# Patient Record
Sex: MALE | URBAN - METROPOLITAN AREA
[De-identification: names, ages, dates, MRNs, and addresses within clinical notes are randomized per-mention and may not be internally consistent; named-entity substitution may affect disease eponyms.]

---

## 2018-02-24 ENCOUNTER — HOSPITAL ENCOUNTER (OUTPATIENT)
Dept: MEDSURG UNIT | Facility: HOSPITAL | Age: 38
End: 2018-02-26
Attending: INTERNAL MEDICINE | Admitting: INTERNAL MEDICINE

## 2018-02-24 LAB
ABS NEUT (OLG): 4.09 X10(3)/MCL (ref 2.1–9.2)
ALBUMIN SERPL-MCNC: 2.9 GM/DL (ref 3.4–5)
ALBUMIN/GLOB SERPL: 1 RATIO (ref 1–2)
ALP SERPL-CCNC: 57 UNIT/L (ref 45–117)
ALT SERPL-CCNC: 52 UNIT/L (ref 12–78)
AMPHET UR QL SCN: NEGATIVE
APPEARANCE, UA: CLEAR
AST SERPL-CCNC: 20 UNIT/L (ref 15–37)
BACTERIA #/AREA URNS AUTO: ABNORMAL /[HPF]
BARBITURATE SCN PRESENT UR: NEGATIVE
BASOPHILS # BLD AUTO: 0.05 X10(3)/MCL
BASOPHILS NFR BLD AUTO: 1 %
BENZODIAZ UR QL SCN: NEGATIVE
BILIRUB SERPL-MCNC: 0.3 MG/DL (ref 0.2–1)
BILIRUB UR QL STRIP: NEGATIVE
BILIRUBIN DIRECT+TOT PNL SERPL-MCNC: <0.1 MG/DL
BILIRUBIN DIRECT+TOT PNL SERPL-MCNC: ABNORMAL MG/DL
BUN SERPL-MCNC: 5 MG/DL (ref 7–18)
CALCIUM SERPL-MCNC: 8.5 MG/DL (ref 8.5–10.1)
CANNABINOIDS UR QL SCN: NEGATIVE
CHLORIDE SERPL-SCNC: 105 MMOL/L (ref 98–107)
CK MB SERPL-MCNC: 2.5 NG/ML (ref 1–3.6)
CK SERPL-CCNC: 105 UNIT/L (ref 39–308)
CO2 SERPL-SCNC: 29 MMOL/L (ref 21–32)
COCAINE UR QL SCN: NEGATIVE
COLOR UR: YELLOW
CREAT SERPL-MCNC: 1 MG/DL (ref 0.6–1.3)
EOSINOPHIL # BLD AUTO: 0.16 X10(3)/MCL
EOSINOPHIL NFR BLD AUTO: 2 %
ERYTHROCYTE [DISTWIDTH] IN BLOOD BY AUTOMATED COUNT: 13.1 % (ref 11.5–14.5)
ETHANOL SERPL-MCNC: <3 MG/DL
GLOBULIN SER-MCNC: 3.5 GM/ML (ref 2.3–3.5)
GLUCOSE (UA): NORMAL
GLUCOSE SERPL-MCNC: 91 MG/DL (ref 74–106)
HCT VFR BLD AUTO: 41 % (ref 40–51)
HGB BLD-MCNC: 13.7 GM/DL (ref 13.5–17.5)
HGB UR QL STRIP: NEGATIVE
HYALINE CASTS #/AREA URNS LPF: ABNORMAL /[LPF]
IMM GRANULOCYTES # BLD AUTO: 0.03 10*3/UL
IMM GRANULOCYTES NFR BLD AUTO: 0 %
KETONES UR QL STRIP: ABNORMAL
LEUKOCYTE ESTERASE UR QL STRIP: 25 LEU/UL
LITHIUM SERPL-MCNC: 0.8 MMOL/L (ref 0.6–1.2)
LYMPHOCYTES # BLD AUTO: 3 X10(3)/MCL
LYMPHOCYTES NFR BLD AUTO: 36 % (ref 13–40)
MAGNESIUM SERPL-MCNC: 1.8 MG/DL (ref 1.8–2.4)
MCH RBC QN AUTO: 30.3 PG (ref 26–34)
MCHC RBC AUTO-ENTMCNC: 33.4 GM/DL (ref 31–37)
MCV RBC AUTO: 90.7 FL (ref 80–100)
MONOCYTES # BLD AUTO: 0.95 X10(3)/MCL
MONOCYTES NFR BLD AUTO: 12 % (ref 4–12)
NEUTROPHILS # BLD AUTO: 4.09 X10(3)/MCL
NEUTROPHILS NFR BLD AUTO: 49 X10(3)/MCL
NITRITE UR QL STRIP: NEGATIVE
OPIATES UR QL SCN: NEGATIVE
PCP UR QL: NEGATIVE
PH UR STRIP.AUTO: 7 [PH] (ref 5–8)
PH UR STRIP: 7 [PH] (ref 4.5–8)
PHOSPHATE SERPL-MCNC: 3 MG/DL (ref 2.5–4.9)
PLATELET # BLD AUTO: 219 X10(3)/MCL (ref 130–400)
PMV BLD AUTO: 11.2 FL (ref 7.4–10.4)
POC TROPONIN: 0 NG/ML (ref 0–0.08)
POTASSIUM SERPL-SCNC: 3.6 MMOL/L (ref 3.5–5.1)
PROT SERPL-MCNC: 6.4 GM/DL (ref 6.4–8.2)
PROT UR QL STRIP: 10 MG/DL
RBC # BLD AUTO: 4.52 X10(6)/MCL (ref 4.5–5.9)
RBC #/AREA URNS AUTO: ABNORMAL /[HPF]
SODIUM SERPL-SCNC: 140 MMOL/L (ref 136–145)
SP GR UR STRIP: 1.01 (ref 1–1.03)
SQUAMOUS #/AREA URNS LPF: ABNORMAL /[LPF]
TEMPERATURE, URINE (OHS): 25 DEGC (ref 20–25)
TSH SERPL-ACNC: 1.74 MIU/L (ref 0.36–3.74)
UROBILINOGEN UR STRIP-ACNC: 2 MG/DL
WBC # SPEC AUTO: 8.3 X10(3)/MCL (ref 4.5–11)
WBC #/AREA URNS AUTO: ABNORMAL /HPF

## 2018-02-25 LAB
ABS NEUT (OLG): 10.36 X10(3)/MCL (ref 2.1–9.2)
ALBUMIN SERPL-MCNC: 2.7 GM/DL (ref 3.4–5)
ALBUMIN/GLOB SERPL: 1 RATIO (ref 1–2)
ALP SERPL-CCNC: 47 UNIT/L (ref 45–117)
ALT SERPL-CCNC: 47 UNIT/L (ref 12–78)
AST SERPL-CCNC: 20 UNIT/L (ref 15–37)
BASOPHILS # BLD AUTO: 0.01 X10(3)/MCL
BASOPHILS NFR BLD AUTO: 0 %
BILIRUB SERPL-MCNC: 0.4 MG/DL (ref 0.2–1)
BILIRUBIN DIRECT+TOT PNL SERPL-MCNC: 0.1 MG/DL
BILIRUBIN DIRECT+TOT PNL SERPL-MCNC: 0.3 MG/DL
BUN SERPL-MCNC: 9 MG/DL (ref 7–18)
CALCIUM SERPL-MCNC: 9.1 MG/DL (ref 8.5–10.1)
CHLORIDE SERPL-SCNC: 107 MMOL/L (ref 98–107)
CO2 SERPL-SCNC: 27 MMOL/L (ref 21–32)
CREAT SERPL-MCNC: 1.2 MG/DL (ref 0.6–1.3)
ERYTHROCYTE [DISTWIDTH] IN BLOOD BY AUTOMATED COUNT: 13.1 % (ref 11.5–14.5)
GLOBULIN SER-MCNC: 3.5 GM/ML (ref 2.3–3.5)
GLUCOSE SERPL-MCNC: 144 MG/DL (ref 74–106)
HCT VFR BLD AUTO: 41 % (ref 40–51)
HGB BLD-MCNC: 13.7 GM/DL (ref 13.5–17.5)
IMM GRANULOCYTES # BLD AUTO: 0.06 10*3/UL
IMM GRANULOCYTES NFR BLD AUTO: 0 %
LYMPHOCYTES # BLD AUTO: 1.44 X10(3)/MCL
LYMPHOCYTES NFR BLD AUTO: 12 % (ref 13–40)
MCH RBC QN AUTO: 30.2 PG (ref 26–34)
MCHC RBC AUTO-ENTMCNC: 33.4 GM/DL (ref 31–37)
MCV RBC AUTO: 90.5 FL (ref 80–100)
MONOCYTES # BLD AUTO: 0.33 X10(3)/MCL
MONOCYTES NFR BLD AUTO: 3 % (ref 4–12)
NEUTROPHILS # BLD AUTO: 10.36 X10(3)/MCL
NEUTROPHILS NFR BLD AUTO: 85 X10(3)/MCL
PLATELET # BLD AUTO: 261 X10(3)/MCL (ref 130–400)
PMV BLD AUTO: 11.7 FL (ref 7.4–10.4)
POTASSIUM SERPL-SCNC: 4.3 MMOL/L (ref 3.5–5.1)
PROT SERPL-MCNC: 6.2 GM/DL (ref 6.4–8.2)
RBC # BLD AUTO: 4.53 X10(6)/MCL (ref 4.5–5.9)
SODIUM SERPL-SCNC: 142 MMOL/L (ref 136–145)
WBC # SPEC AUTO: 12.2 X10(3)/MCL (ref 4.5–11)

## 2022-04-30 NOTE — DISCHARGE SUMMARY
Patient:   Tyler Mcdaniels            MRN: [id number]            FIN: [id number]               Age:   37 years     Sex:  Male     :  1980   Associated Diagnoses:   None   Author:   Chacha Nolan MD      Discharge Information   Discharge Summary Information:  Admitted  2018, Discharged  2018.         Admitting physician: Elma Hernandez MD      Discharge Diagnosis: Upper Respiratory Tract Infection, Bipolar Disorder, Chronic Tobacco Use      Hospital Course   Hospital Course   Admitted from: from emergency department.       This is a 37-year-old  male with past medical history of bipolar disorder, asthma and obesity as well as chronic tobacco use who was admitted to hospital on 2018 for community-acquired pneumonia.  Chest x-ray showed a retrocardiac infiltrate on saturations are only 90% on room air.  He was PEC for suicidal ideation and admitted for treatment acutely.  Patient stated that he did not have a place to go to and all of his family members deserted him and this was the reason for his suicidal ideation.  He is being discharged after case management found him an inpatient psychiatry facility to go to.      Physical Examination     General: AAOx3, NAD, alert and cooperative  HEENT: PERRLA, EOMI, normal conjunctiva  Neck: No LAD, no JVD, supple  CVS: S1/S2 nml, RRR, no murmurs, rubs or gallops  Resp: breath sounds equal bilaterally, mild rhonchi heard bilaterally  GI: Not TTP, not distended, BS+  Musculoskeletal: normal ROM, no joint tenderness, normal muscular development  Extremities: no peripheral edema, peripheral pulses intact  Neuro: CN II-XII grossly intact, no focal neurological deficits        Discharge Plan   Discharge Summary Plan   Discharge Status: improved.     Discharge instructions given: to patient.     Discharge disposition: discharge to skilled nursing facility Psychiatry Inpatient.     Orders     Orders (Selected)    Prescriptions  Prescribed  Levaquin 500 mg oral tablet: 500 mg = 1 tab(s), Oral, q24hr, X 3 day(s), # 3 tab(s), 0 Refill(s), [facility]  Ventolin HFA 90 mcg/inh inhalation aerosol: 2 puff(s), INH, QID, PRN PRN as needed for wheezing, # 3 EA, 0 Refill(s), [facility]  guaifenesin 100 mg/5 mL oral liquid: 200 mg = 10 mL, Oral, q4hr, PRN PRN cough, X 5 day(s), # 240 mL, 0 Refill(s), [facility]  lisinopril 20 mg oral tablet: 20 mg = 1 tab(s), Oral, Daily, # 30 tab(s), 0 Refill(s), [facility]  simvastatin 40 mg oral tablet: 40 mg = 1 tab(s), Oral, Once a day (at bedtime), # 30 tab(s), 0 Refill(s), [facility].        Education and Follow-up   Counseled: patient.

## 2022-04-30 NOTE — H&P
"* Final Report *   Document Contains Addenda  CC: Suicidal ideations, shortness of breath    HPI: Patient is a 37-year-old male with past medical history bipolar disorder type II, asthma, obesity, as well as a pack per day smoking history the past 10+ years who presented to the emergency department earlier this evening for suicidal ideations. At that time patient also complained of cough and shortness of breath the past 2-3 months. Productive cough with "oyster shape". Patient denies fever, chills, but has been around recent sick contacts. Patient states that he does not use albuterol inhaler secondary to insurance reasons. Patient was found to be with O2 saturation of 90% on room air emergency department. Chest x-ray showed retrocardiac infiltration. Patient given duo nebs ×3 in the emergency department with 1 dose of 180 mg Solu-Medrol without subsequent improvement of oxygenation. Internal medicine was consulted for admission due to low O2 saturation for subsequent medical clearance for PEC and discharge to psychiatric facility for suicidal ideations.      Relevant Labs: CMP and CBC within normal limits. Lithium level therapeutic. Urine drug screen negative. Rapid HIV negative.  Relevant Imaging: See HPI.    ROS  CON: No fevers, No chills, No weight loss, No decreased appetite, No night sweats  HEENT: No visual changes, No rhinorrhea, No sore throat  CV: + Chest pain .no palpitations, No leg swelling  RESP: See HPI  GI: No N/V, No diarrhea, No abdominal pain, No bloody stool, No dark stool   JOSEPH: + Dysuria, no abnormal discharge, no dark urine  HEME: No bruising, No mucosal bleeding  CNS: No LOC, No changes in sensation  ENDO: No polydipsia, No goiter  MSK: No weakness, No focal muscular tenderness    PMHx:  Auditory hallucinations  Chronic back pain  Chronic neck pain  Chronic sinusitis  Schizophrenia  Tobacco user    SHx:  jaw  Tonsillectomy, primary or secondary; age 12 or over    FmHx:  Mother: Metastatic " cancer    SocHx:  ETOH: Occasional  Tobacco: 1ppd  IVDU: No    Allergies:   penicillins      Home Medications (15) Active  ABILIFY MAIN INJ 400MG   atomoxetine 40 mg oral capsule 40 mg = 1 cap(s), Oral, qAM  clonazePAM 1 mg oral tablet 1 mg = 1 tab(s), Oral, TID  doxepin 150 mg oral capsule 150 mg = 1 cap(s), Oral, Once a day (at bedtime)  doxepin 25 mg oral capsule 1 tab, Oral, AC  doxepin 25 mg oral capsule 25 mg = 1 cap(s), Oral, NOON  lisinopril 10 mg oral tablet 10 mg = 1 tab(s), Oral, Daily  lithium 300 mg oral capsule 300 mg = 1 cap(s), Oral, AC  lithium 300 mg oral tablet, extended release 600 mg = 2 tab(s), Oral, At Bedtime  LYRICA CAP 50MG 50 mg = 1 cap(s), Oral, BID  METOPROLOL TAB 50MG ER 50 mg = 1 tab(s), Oral, qPM  naproxen 500 mg oral tablet 500 mg = 1 tab(s), Oral, BID  olanzapine 15 mg oral tablet 30 mg = 2 tab(s), Oral, At Bedtime  omega-3 polyunsaturated fatty acids (Fish Oil 1000mg Cap) 2 cap, Oral, At Bedtime  simvastatin 40 mg oral tablet 40 mg = 1 tab(s), Oral, Once a day (at bedtime)      Vital Signs (last 24 hrs)_____ Last Charted___________  Temp Oral 37.1 DegC (FEB 24 16:20)  Heart Rate Peripheral H 117bpm (FEB 24 21:00)  Resp Rate H 26br/min (FEB 24 21:00)   mmHg (FEB 24 18:00)  DBP 71 mmHg (FEB 24 18:00)  SpO2 94 % (FEB 24 21:00)  Weight 104.3 kg (FEB 24 16:20)      Physical Exam  GEN: NAD  HEAD: NC/AT  EYES: Normal sclera, EOMI, No APD  MOUTH: No oral lesions, No pharyngeal erythema  NECK: No JVD, No bruits; No thyromegaly; no palpaple lymph nodes  CV: RRR; Normal heart sounds; RP/DP 2+ bilaterally, 2+ pitting edema in lower extremities bilaterally up to the distal one third of the tibia  RESP: Diffuse and impressive rhonchi bilaterally, also heard over the anterior apices of the lungs. Bronchial congestion is heard with patient's speech.  ABD: Soft, NTND, No rigidity, No striae  MSK: ROM grossly normal, no neck stiffness, no peripheral edema  INTEG: Multiple scaly red plaques  present over the superior chest wall without drainage or bleeding. No excoriations.  NEURO: AAOx3, No focal weakness, CN2-12 intact  PSYCH: flat affect. Speech content is coherent and appropriate. Endorses previous suicidal ideations. Endorses occasional auditory hallucinations, but not currently.    Laboratories    Labs Last 24 Hours   Chemistry Hematology/Coagulation   Sodium Lvl: 140 mmol/L (02/24/18 17:28:52) WBC: 8.3 x10(3)/mcL (02/24/18 17:08:32)   Potassium Lvl: 3.6 mmol/L (02/24/18 17:28:52) RBC: 4.52 x10(6)/mcL (02/24/18 17:08:32)   Chloride: 105 mmol/L (02/24/18 17:28:52) Hgb: 13.7 gm/dL (02/24/18 17:08:32)   CO2: 29 mmol/L (02/24/18 17:28:52) Hct: 41 % (02/24/18 17:08:32)   Calcium Lvl: 8.5 mg/dL (02/24/18 17:28:52) Platelet: 219 x10(3)/mcL (02/24/18 17:08:32)   Magnesium Lvl: 1.8 mg/dL (02/24/18 17:48:28) MCV: 90.7 fL (02/24/18 17:08:32)   Glucose Lvl: 91 mg/dL (02/24/18 17:28:52) MCH: 30.3 pg (02/24/18 17:08:32)   BUN: 5 mg/dL Low (02/24/18 17:28:52) MCHC: 33.4 gm/dL (02/24/18 17:08:32)   Creatinine: 1 mg/dL (02/24/18 17:28:52) RDW: 13.1 % (02/24/18 17:08:32)   eGFR-AA: >105 (02/24/18 17:28:53) MPV: 11.2 fL High (02/24/18 17:08:32)   eGFR-MARGY: 90 mL/min (02/24/18 17:28:56) Abs Neut: 4.09 x10(3)/mcL (02/24/18 17:08:32)   Bili Total: 0.3 mg/dL (02/24/18 17:28:52) Neutro Auto: 49 x10(3)/mcL (02/24/18 17:08:33)   Bili Direct: <0.1 (02/24/18 17:28:52) Lymph Auto: 36 % (02/24/18 17:08:33)   Bili Indirect: CALC NOT VALID (02/24/18 17:28:52) Mono Auto: 12 % (02/24/18 17:08:33)   AST: 20 unit/L (02/24/18 17:28:52) Eos Auto: 2 (02/24/18 17:08:33)   ALT: 52 unit/L (02/24/18 17:28:52) Abs Eos: 0.16 x10(3)/mcL (02/24/18 17:08:33)   Alk Phos: 57 unit/L (02/24/18 17:28:52) Basophil Auto: 1 (02/24/18 17:08:33)   Total Protein: 6.4 gm/dL (02/24/18 17:28:52) Abs Neutro: 4.09 x10(3)/mcL (02/24/18 17:08:33)   Albumin Lvl: 2.9 gm/dL Low (02/24/18 17:28:52) Abs Lymph: 3 x10(3)/mcL (02/24/18 17:08:33)   Globulin: 3.5 gm/mL  (02/24/18 17:28:52) Abs Mono: 0.95 x10(3)/mcL (02/24/18 17:08:33)   A/G Ratio: 1 ratio (02/24/18 17:28:52) Abs Baso: 0.05 x10(3)/mcL (02/24/18 17:08:33)   Phosphorus: 3 mg/dL (02/24/18 17:48:29) IG%: 0 % (02/24/18 17:08:33)   NT pro BNP.: 37 pg/mL (02/24/18 17:28:57) IG#: 0.03 (02/24/18 17:08:33)   Total CK: 105 unit/L (02/24/18 17:28:54)    CK MB: 2.5 ng/mL (02/24/18 17:28:55)    POC Troponin: 0 ng/mL (02/24/18 17:00:41)    TSH: 1.74 mIU/L (02/24/18 17:48:30)    U pH: 7 (02/24/18 17:46:48)              Imaging    Accession: [id number]  Order: XR Chest 2 Views  Report Dt/Tm: 02/24/2018 20:08  Report:     CLINICAL: Chest pain.    COMPARISON: December 7, 2016.    FINDINGS: Cardiopericardial silhouette is within normal limits.   Lungs are without dense focal or segmental consolidation, congestion,  pleural effusion or pneumothorax.     IMPRESSION:    NO ACUTE CARDIOPULMONARY PROCESS IDENTIFIED.         Medications (15) Active  Scheduled: (8)  albuterol-ipratropium 3mg-0.5 mg/3 mL Sol 3 mL, NEB, q4hr  doxepin 25 mg Cap UD 25 mg 1 cap(s), Oral, BID  levofloxacin 500 mg Tab  mg 1 tab(s), Oral, Daily  lithium 300 mg Cap 300 mg 1 cap(s), Oral, Daily  OLANZapine 5 mg Tab UD 15 mg 3 tab(s), Oral, Daily  OXcarbazepine 300 mg Tab  mg 1 tab(s), Oral, BID  prednisone 20 mg Tab UD 40 mg 2 tab(s), Oral, Daily  simvastatin 20 mg Tab UD 40 mg 2 tab(s), Oral, Once a day (at bedtime)  Continuous: (1)  sodium chloride 0.9% 1,000 mL 1,000 mL, IV, 1,000 mL/hr  PRN: (6)  acetaminophen 325 mg Tab 650 mg 2 tab(s), Oral, q6hr  guaiFENesin 100 mg/5 mL Liqu UD 5 mL 200 mg 10 mL, Oral, q4hr  ibuprofen 400 mg Tab  mg 1 tab(s), Oral, q6hr  ondansetron 2 mg/mL inj - 2mL 4 mg 2 mL, IV Push, q4hr  ondansetron 2 mg/mL inj - 2mL 8 mg 4 mL, IV Piggyback, q4hr  promethazine 25 mg/mL Inj 12.5 mg 0.5 mL, IV Push, q4hr      Assessment and Plan    Upper respiratory infection with prominent secretions   Bipolar disorder type I w/ current  suicidal ideations  Tobacco use  Obesity      Guaifenesin for expectoration. Also starting Levaquin 500 mg p.o. daily. DuoNebs q4hr PRN wheezing, and prednisone 40mg QD. Restarting patient on home psych medications. 1-1 setting as patient is currently PEC'd and had prior suicidal ideations in the emergency department. No signs of sepsis. Likely some chronic bronchitis w/ pt's smoking hx as well. Continue to monitor.  Addendum by Elma Hernandez MD on February 25, 2018 08:23:18 CST (Verified)  Reviewed history, physical findings, laboratory data and CXR. Agree with finding of retrocardiac infiltrate. No leukocytosis at presentation. Agree with emperic antibiotics and supportive measures as documented.  Result type: Internal Medicine Office/Clinic Note   Result date: February 24, 2018 21:33 CST   Result status: Modified   Result title: H&P   Performed by: Cb Vega MD on February 24, 2018 21:33 CST   Verified by: Cb Vega MD on February 25, 2018 0:55 CST   Encounter info: [id number], [facility], Observation, 2/24/2018 - 2/26/2018   Doc has been moved by HIM specialist

## 2022-04-30 NOTE — ED PROVIDER NOTES
Patient:   Tyler Mcdaniels            MRN: [id number]            FIN: [id number]               Age:   37 years     Sex:  Male     :  1980   Associated Diagnoses:   Bipolar depression; CAP (community acquired pneumonia); Feeling suicidal; Homicidal ideations; Acute asthma exacerbation   Author:   Shlomo GAMBLE, Doyle CLEARY      Basic Information   Time seen: Immediately upon arrival.   History source: Patient.   Arrival mode: Ambulance.   History limitation: None.      History of Present Illness   The patient presents with difficulty breathing, cough and wheezing.  The onset was just prior to arrival.  The course/duration of symptoms is constant.  Degree at onset moderate.  Degree at present moderate.  The Exacerbating factors is none.  There are Relieving factorss including oxygen and beta-agonist.  Risk factors consist of asthma.  Prior episodes: occasional.  Therapy today: emergency medical services and ASA, Nitro x 1, duo neb x 1.  Associated symptoms: cough, denies chest pain, denies fever, denies chills, denies vomiting, denies weight gain and denies hemoptysis.        Review of Systems   Constitutional symptoms:  No fever, no chills, no sweats, no weakness.    Skin symptoms:  No rash, no lesion.    Eye symptoms:  No recent vision problems,    ENMT symptoms:  Negative except as documented in HPI.   Respiratory symptoms:  Negative except as documented in HPI, No orthopnea,    Cardiovascular symptoms:  No chest pain, no palpitations, no tachycardia, no syncope, no diaphoresis, no peripheral edema.    Gastrointestinal symptoms:  No abdominal pain, no nausea, no vomiting, no diarrhea, no constipation, no rectal bleeding.    Genitourinary symptoms:  No dysuria, no hematuria, no testicular pain.    Musculoskeletal symptoms:  No back pain, no Joint pain.    Neurologic symptoms:  No headache, no dizziness.    Psychiatric symptoms:  Depression, suicidal and homicidal ideations.    Allergy/immunologic  symptoms:  Negative except as documented in HPI.             Additional review of systems information: All other systems reviewed and otherwise negative.      Health Status   Allergies:    Allergic Reactions (Selected)  Severity Not Documented  Penicillins- No reactions were documented.,    Allergies (1) Active Reaction  penicillins None Documented  .   Medications:  (Selected)   Inpatient Medications  Ordered  DuoNeb 0.5 mg-2.5 mg/3 mL inhalation solution: 3 mL, form: Soln, NEB, q15min, Order duration: 3 dose(s), first dose 18 16:22:00 CST, stop date 18 17:06:00 CST  LORazepam 2mg/mL for IM / IV Push: 2 mg, form: Injection, IM, Once, first dose 07/02/15 16:00:00 CDT, stop date 07/02/15 16:00:00 CDT, 1,[id number]  Solumedrol IV push / IM: 125 mg, form: Injection, IV Push, Once-NOW, first dose 18 16:22:00 CST, stop date 18 16:22:00 CST  Documented Medications  Documented  ABILIFY MAIN INJ 400MG:   LYRICA       CAP 50M mg = 1 cap(s), Oral, BID  METOPROLOL TAB 50MG ER: 50 mg = 1 tab(s), Oral, qPM  atomoxetine 40 mg oral capsule: 40 mg = 1 cap(s), Oral, qAM, 0 Refill(s)  clonazePAM 1 mg oral tablet: 1 mg = 1 tab(s), Oral, TID, 0 Refill(s)  doxepin 150 mg oral capsule: 150 mg = 1 cap(s), Oral, Once a day (at bedtime), 0 Refill(s)  doxepin 25 mg oral capsule: 1 tab, Oral, AC, 0 Refill(s)  doxepin 25 mg oral capsule: 25 mg = 1 cap(s), Oral, NOON, 0 Refill(s)  lisinopril 10 mg oral tablet: 10 mg = 1 tab(s), Oral, Daily, 0 Refill(s)  lithium 300 mg oral capsule: 300 mg = 1 cap(s), Oral, AC, 0 Refill(s)  lithium 300 mg oral tablet, extended release: 600 mg = 2 tab(s), Oral, At Bedtime, 0 Refill(s)  naproxen 500 mg oral tablet: 500 mg = 1 tab(s), Oral, BID, 0 Refill(s)  olanzapine 15 mg oral tablet: 30 mg = 2 tab(s), Oral, At Bedtime, 0 Refill(s)  omega-3 polyunsaturated fatty acids (Fish Oil 1000mg Cap): 2 cap, Oral, At Bedtime, 0 Refill(s)  simvastatin 40 mg oral tablet: 40 mg = 1 tab(s), Oral, Once  a day (at bedtime), 0 Refill(s).      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  Blood pressure elevated ([id number]):  Resolved.  Bipolar ([id number]):  Resolved..   Surgical history:    jaw.  Tonsillectomy, primary or secondary; age 14 or over (68166)..   Family history:    Metastatic cancer  Mother  .   Social history:    Social & Psychosocial Habits    Alcohol  02/14/2015  Type: Beer    Comment: DRINKS ONCE A MONTH - 02/14/2015 22:40 - Jaimie VASQUEZ , [employer]    03/08/2015 Risk Assessment: High Risk    Substance Abuse  04/15/2015 Risk Assessment: High Risk    07/02/2015  Type: Cocaine, Marijuana    Comment: ONCE EVERY 2 WEEKS - 02/14/2015 22:40 - Jaimie VASQUEZ [employer]    10/19/2017  Use: Past    Tobacco  04/15/2015 Risk Assessment: High Risk    07/02/2015  Use: Never smoker    10/19/2017  Use: Current every day smoker    Type: Cigarettes  , Alcohol use: Denies, Tobacco use: Regularly, Drug use: Marijuana.   Problem list:    Active Problems (6)  Auditory hallucinations   Chronic back pain   Chronic neck pain   Chronic sinusitis   Schizophrenia   Tobacco user   .      Physical Examination               Vital Signs      No qualifying data available.   Oxygen saturation.   General:  Alert, mild distress.    Skin:  Warm, dry, pink, intact, no pallor, no rash.    Head:  Normocephalic, atraumatic.    Neck:  Supple, trachea midline, no tenderness, no JVD, no carotid bruit.    Eye:  Pupils are equal, round and reactive to light, extraocular movements are intact, normal conjunctiva.    Ears, nose, mouth and throat:  Oral mucosa moist, no pharyngeal erythema or exudate.    Cardiovascular:  Regular rate and rhythm, No murmur, Normal peripheral perfusion, Edema: Bilateral, lower extremity, 1+, pitting.    Respiratory:  Breath sounds are equal, Symmetrical chest wall expansion, Respirations: Prolonged, Breath sounds: Bilateral,  rhonchi present, wheezes present.    Gastrointestinal:  Soft, Nontender, Non distended, Normal bowel sounds, No organomegaly.    Back:  Nontender, Normal range of motion.    Musculoskeletal:  Normal ROM, normal strength, no swelling.    Neurological:  Alert and oriented to person, place, time, and situation, No focal neurological deficit observed, normal motor observed, normal speech observed, normal coordination observed.    Psychiatric:  Cooperative, Mood and affect: Anxious, Abnormal / Psychotic thoughts: Suicidal, homicidal.       Medical Decision Making   Electrocardiogram:  Time 2/24/2018 16:26:00, rate 106, No ST-T changes, no ectopy, normal CA & QRS intervals, EP Interp, The Rhythm is sinus tachycardia.  , The Axis is normal.  .    Results review:  Lab results : Lab View   2/24/2018 16:35 CST      Sodium Lvl                140 mmol/L                             Potassium Lvl             3.6 mmol/L                             CO2                       29 mmol/L                             Glucose Lvl               91 mg/dL                             BUN                       5 mg/dL  LOW                             Creatinine                1.00 mg/dL                             Bili Total                0.3 mg/dL                             AST                       20 unit/L                             ALT                       52 unit/L                             Alk Phos                  57 unit/L                             NT pro BNP.               37 pg/mL                             Total CK                  105 unit/L                             CK MB                     2.5 ng/mL                             POC Troponin              0.00 ng/mL                             WBC                       8.3 x10(3)/mcL                             Hgb                       13.7 gm/dL                             Hct                       41.0 %                             Platelet                  219  x10(3)/mcL                             Neutro Auto               49 x10(3)/mcL  NA                             Lymph Auto                36 %                             Mono Auto                 12 %                             Ethanol Lvl               <3.0 mg/dL    ,    No qualifying data available.    Chest X-Ray:  Time reported 2/24/2018 17:41:00, interpretation by Emergency Physician, Retrocardiac opacity.       Reexamination/ Reevaluation   Time: 2/24/2018 20:23:00 .   Assessment: Better air movement but still with ronchi and hypoxic with pulse Ox 89% RA. Will admit.      Impression and Plan   Diagnosis   Bipolar depression (HBZ14-QR F31.30)   CAP (community acquired pneumonia) (CJC25-PA J18.9)   Feeling suicidal (YSY60-WD R45.851)   Homicidal ideations (DQN43-IY R45.850)   Acute asthma exacerbation (AQH30-VW J45.901)      Calls-Consults   -  2/24/2018 20:25:00 , Medicine, consult.    Plan   Condition: Improved, Stable.    Disposition: Admit time  2/24/2018 17:43:00, Place in Observation Telemetry Unit,     Disposition: Medically cleared, Admit time  2/24/2018 17:43:00, [facility], Time 2/24/2018 17:43:00.     Follow up with   Counseled: Patient, Regarding diagnosis, Regarding diagnostic results, Regarding treatment plan, Patient indicated understanding of instructions.